# Patient Record
Sex: MALE | Race: WHITE
[De-identification: names, ages, dates, MRNs, and addresses within clinical notes are randomized per-mention and may not be internally consistent; named-entity substitution may affect disease eponyms.]

---

## 2020-04-29 ENCOUNTER — HOSPITAL ENCOUNTER (INPATIENT)
Dept: HOSPITAL 35 - PRE | Age: 60
LOS: 31 days | Discharge: HOME | DRG: 462 | End: 2020-05-30
Attending: ORTHOPAEDIC SURGERY | Admitting: ORTHOPAEDIC SURGERY
Payer: COMMERCIAL

## 2020-04-29 VITALS — BODY MASS INDEX: 37.47 KG/M2 | WEIGHT: 253 LBS | HEIGHT: 69.02 IN

## 2020-04-29 DIAGNOSIS — Z88.8: ICD-10-CM

## 2020-04-29 DIAGNOSIS — Z88.5: ICD-10-CM

## 2020-04-29 DIAGNOSIS — M17.0: Primary | ICD-10-CM

## 2020-04-29 DIAGNOSIS — Z79.899: ICD-10-CM

## 2020-04-29 PROCEDURE — 51225: CPT

## 2020-04-29 PROCEDURE — 50915: CPT

## 2020-04-29 PROCEDURE — 57103: CPT

## 2020-04-29 PROCEDURE — 57095: CPT

## 2020-04-29 PROCEDURE — 52282 CYSTOSCOPY IMPLANT STENT: CPT

## 2020-04-29 PROCEDURE — 62110: CPT

## 2020-04-29 PROCEDURE — 55372: CPT

## 2020-04-29 PROCEDURE — 62900: CPT

## 2020-04-29 PROCEDURE — 64039: CPT

## 2020-04-29 PROCEDURE — 53078: CPT

## 2020-04-29 PROCEDURE — 70005: CPT

## 2020-04-29 PROCEDURE — 50415: CPT

## 2020-04-29 PROCEDURE — 57110 VAGNC COMPL RMVL VAG WALL: CPT

## 2020-04-29 PROCEDURE — 57127: CPT

## 2020-04-29 PROCEDURE — 56528: CPT

## 2020-04-29 PROCEDURE — 53000 INCISION OF URETHRA: CPT

## 2020-04-29 PROCEDURE — 10102: CPT

## 2020-04-29 PROCEDURE — 50954: CPT

## 2020-04-29 PROCEDURE — 51130: CPT

## 2020-04-29 PROCEDURE — 54118: CPT

## 2020-04-29 PROCEDURE — 56527: CPT

## 2020-04-29 PROCEDURE — 50010 RENAL EXPLORATION: CPT

## 2020-04-29 PROCEDURE — 52001 CYSTO W/IRRG&EVAC MLT CLOTS: CPT

## 2020-04-29 PROCEDURE — 57180 TREAT VAGINAL BLEEDING: CPT

## 2020-04-29 PROCEDURE — 50101: CPT

## 2020-04-29 PROCEDURE — 51320: CPT

## 2020-05-22 LAB
ALBUMIN SERPL-MCNC: 4.2 G/DL (ref 3.4–5)
ANION GAP SERPL CALC-SCNC: 6 MMOL/L (ref 7–16)
BILIRUB UR-MCNC: NEGATIVE MG/DL
BUN SERPL-MCNC: 26 MG/DL (ref 7–18)
CALCIUM SERPL-MCNC: 8.7 MG/DL (ref 8.5–10.1)
CHLORIDE SERPL-SCNC: 105 MMOL/L (ref 98–107)
CO2 SERPL-SCNC: 28 MMOL/L (ref 21–32)
COLOR UR: YELLOW
CREAT SERPL-MCNC: 1.1 MG/DL (ref 0.7–1.3)
ERYTHROCYTE [DISTWIDTH] IN BLOOD BY AUTOMATED COUNT: 14.1 % (ref 10.5–14.5)
GLUCOSE SERPL-MCNC: 95 MG/DL (ref 74–106)
HCT VFR BLD CALC: 45.1 % (ref 42–52)
HGB BLD-MCNC: 15.2 GM/DL (ref 14–18)
INR PPP: 1
KETONES UR STRIP-MCNC: NEGATIVE MG/DL
MCH RBC QN AUTO: 29.7 PG (ref 26–34)
MCHC RBC AUTO-ENTMCNC: 33.7 G/DL (ref 28–37)
MCV RBC: 88.2 FL (ref 80–100)
PLATELET # BLD: 189 THOU/UL (ref 150–400)
POTASSIUM SERPL-SCNC: 4.9 MMOL/L (ref 3.5–5.1)
PROTHROMBIN TIME: 10.4 SECONDS (ref 9.3–11.4)
RBC # BLD AUTO: 5.11 MIL/UL (ref 4.5–6)
RBC # UR STRIP: NEGATIVE /UL
SODIUM SERPL-SCNC: 139 MMOL/L (ref 136–145)
SP GR UR STRIP: 1.01 (ref 1–1.03)
URINE CLARITY: CLEAR
URINE GLUCOSE-RANDOM*: NEGATIVE
URINE LEUKOCYTES-REFLEX: NEGATIVE
URINE NITRITE-REFLEX: NEGATIVE
URINE PROTEIN (DIPSTICK): NEGATIVE
UROBILINOGEN UR STRIP-ACNC: 0.2 E.U./DL (ref 0.2–1)
WBC # BLD AUTO: 5.1 THOU/UL (ref 4–11)

## 2020-05-27 VITALS — DIASTOLIC BLOOD PRESSURE: 93 MMHG | SYSTOLIC BLOOD PRESSURE: 133 MMHG

## 2020-05-27 VITALS — SYSTOLIC BLOOD PRESSURE: 132 MMHG | DIASTOLIC BLOOD PRESSURE: 89 MMHG

## 2020-05-27 VITALS — DIASTOLIC BLOOD PRESSURE: 86 MMHG | SYSTOLIC BLOOD PRESSURE: 128 MMHG

## 2020-05-27 VITALS — SYSTOLIC BLOOD PRESSURE: 129 MMHG | DIASTOLIC BLOOD PRESSURE: 83 MMHG

## 2020-05-27 VITALS — DIASTOLIC BLOOD PRESSURE: 83 MMHG | SYSTOLIC BLOOD PRESSURE: 131 MMHG

## 2020-05-27 VITALS — SYSTOLIC BLOOD PRESSURE: 131 MMHG | DIASTOLIC BLOOD PRESSURE: 90 MMHG

## 2020-05-27 PROCEDURE — 0SRC069 REPLACEMENT OF RIGHT KNEE JOINT WITH OXIDIZED ZIRCONIUM ON POLYETHYLENE SYNTHETIC SUBSTITUTE, CEMENTED, OPEN APPROACH: ICD-10-PCS | Performed by: ORTHOPAEDIC SURGERY

## 2020-05-27 PROCEDURE — 8E0Y0CZ ROBOTIC ASSISTED PROCEDURE OF LOWER EXTREMITY, OPEN APPROACH: ICD-10-PCS | Performed by: ORTHOPAEDIC SURGERY

## 2020-05-27 PROCEDURE — 0SRD069 REPLACEMENT OF LEFT KNEE JOINT WITH OXIDIZED ZIRCONIUM ON POLYETHYLENE SYNTHETIC SUBSTITUTE, CEMENTED, OPEN APPROACH: ICD-10-PCS | Performed by: ORTHOPAEDIC SURGERY

## 2020-05-27 NOTE — NUR
PATIENT S/P BILATERAL TOTAL KNEE REPLACEMENT WITH BILATERAL POLAR PACKS, GEORGE
DRESSINGS, AND SCDs. LEGS ELEVATED FOR COMFORT, TURNING SELF TO SIDE, PUMPING
ANKLES, AND USING IS. MS CONTIN BID ALREADY INITIATED, PLAN PERCOCET LATER
WITH PATIENT CONCERNED ABOUT HOW MUCH PAIN HE WILL HAVE WHEN THE BLOCK WEARS
OFF.

## 2020-05-28 VITALS — SYSTOLIC BLOOD PRESSURE: 99 MMHG | DIASTOLIC BLOOD PRESSURE: 61 MMHG

## 2020-05-28 VITALS — SYSTOLIC BLOOD PRESSURE: 107 MMHG | DIASTOLIC BLOOD PRESSURE: 58 MMHG

## 2020-05-28 VITALS — SYSTOLIC BLOOD PRESSURE: 122 MMHG | DIASTOLIC BLOOD PRESSURE: 70 MMHG

## 2020-05-28 VITALS — DIASTOLIC BLOOD PRESSURE: 68 MMHG | SYSTOLIC BLOOD PRESSURE: 124 MMHG

## 2020-05-28 VITALS — DIASTOLIC BLOOD PRESSURE: 69 MMHG | SYSTOLIC BLOOD PRESSURE: 127 MMHG

## 2020-05-28 LAB
ERYTHROCYTE [DISTWIDTH] IN BLOOD BY AUTOMATED COUNT: 13.4 % (ref 10.5–14.5)
HCT VFR BLD CALC: 39 % (ref 42–52)
HGB BLD-MCNC: 13.3 GM/DL (ref 14–18)
MCH RBC QN AUTO: 30 PG (ref 26–34)
MCHC RBC AUTO-ENTMCNC: 34.2 G/DL (ref 28–37)
MCV RBC: 87.6 FL (ref 80–100)
PLATELET # BLD: 200 THOU/UL (ref 150–400)
RBC # BLD AUTO: 4.45 MIL/UL (ref 4.5–6)
WBC # BLD AUTO: 12.9 THOU/UL (ref 4–11)

## 2020-05-28 NOTE — NUR
VSS-LOW GRADE TEMP OF 99.3 AT END OF SHIFT.  C/O PAIN BEHIND RIGHT KNEE, SMALL
BUMP PALPABLE.  NO REDNESS WITH RIGHT CALF.  LEFT CALF AREA IS REDDENED, BUT
NOT SORE, AND NO HARD SPOTS PALPABLE.  STAT ULTRASOUND ORDERED PER DR WHITE.
OOB WITH PHYSICAL THERAPY, TOLERATED WELL.  SPENT MOST OF AFTERNOON IN CHAIR.
GOOD APPETITE, TOLERATED MEALS WITH NO REPORTED N/V.  PAIN WELL CONTROLLED
WITH PO PAIN MEDICATIONS.  CALLS APPROPRIATELY FOR ANY NEEDED ASSISTANCE.

## 2020-05-28 NOTE — NUR
MINIMAL PAIN, PATIENT REPOSTIONING SELF FREQUENTLY AS HE ELEVATES HIS LEGS
WHILE KEEPING KNEES STRAIGHT. ENCOURAGING PERCOCET IF PAIN INCREASES
HAS NOT NEEDED TO BE STRAIGHT CATHED AGAIN WITH A 400 CC VOID NOW AND A
PRACTICALLY FULL URINAL (SPILLED) A LITTLE BEFORE 0300

## 2020-05-28 NOTE — NUR
chart review. ray visited with ashlee via phone call. he is a & o x 4, pleasant
and able to make his needs know. he reported " live in house with wife, safe
and support. independent when feeling ok. has cane and walker, both function
properly and are not broken. drives vehicle. 2 steps with hr into home, inside
everything on main level, do not have to go to the finished basement 12
stairs with hr x 1. have walk in shower and then have shower chair. out pt
rehab at [facility], their therapy is great. [facility] for outpt rehab
at [place] on june 1st.  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
thank you. would like to be dc by 10am since have 7-8 hour drive and have to
stop every hour. hopeful this will work tomorrow"/ashlee. information passed
on to bedside nurse rt dc time for pt long drive home.

## 2020-05-28 NOTE — O
[facility]
[address]
[place]   [address]                     OPERATIVE REPORT              
_______________________________________________________________________________
 
Name:       GRETA GUADALUPE           Room #:         440-P       ADM IN  
M.R.#:      [id number]                       Account #:      [id number]  
Admission:  05/27/20    Attend Phys:    Jeronimo Luis MD  
Discharge:              Date of Birth:  [birth year]  
                                                          Report #: [id number]
                                                                    [id number]   
_______________________________________________________________________________
THIS REPORT FOR:  
 
cc:  OLLIE BAJWA               
     Physician not on staff                                               
     Jeronimo Luis MD                                          ~
CC: OLLIE BAJWA
    Physician staff
    Jeronimo Luis
 
DATE OF SERVICE:  05/27/2020
 
 
PREOPERATIVE DIAGNOSIS:  Bilateral knee osteoarthritis.
 
POSTOPERATIVE DIAGNOSIS:  Bilateral knee osteoarthritis.
 
PROCEDURE:  Bilateral total knee arthroplasty using Navio robotic assistance.
 
SURGEON:  Jeronimo Luis MD.
 
ASSISTANT:  Philly Marshall PA-C.
 
INDICATIONS FOR ASSISTANT:  Throughout the case, extensive retraction and
manipulation of the knees were required.  This was afforded to me by my
assistant.
 
ANESTHESIA:  LMA with adductor canal blocks.
 
IMPLANTS:  Bilateral Smith and Nephew size 6 Journey II BCS Oxinium femur, size
5 tibia, size 9 constrained polyethylene and size 35 patella.
 
TOURNIQUET TIME:  56 minutes on the right and 58 minutes on the left.
 
ESTIMATED BLOOD LOSS:  50 mL.
 
COMPLICATIONS:  None.
 
SPECIMENS:  None.
 
CONDITION UPON LEAVING THE OPERATING ROOM:  Stable.
 
INDICATIONS FOR PROCEDURE:  The patient is a 59-year-old gentleman with
bilateral knee osteoarthritis.  He had failed conservative measures for this and
after discussion with him, he elected for bilateral total knee arthroplasty.
 
DESCRIPTION OF PROCEDURE:  This applies to bilateral knees.  Bilateral knees
 
 
 
[facility]
[address]
[place]   [address]                     OPERATIVE REPORT              
_______________________________________________________________________________
 
Name:       GRETA GUADALUPE           Room #:         440-P       ADM IN  
M.R.#:      [id number]                       Account #:      [id number]  
Admission:  05/27/20    Attend Phys:    Jeronimo Luis MD  
Discharge:              Date of Birth:  [birth year]  
                                                          Report #: [id number]
                                                                    [id number]   
_______________________________________________________________________________
were marked in the preoperative holding area.  IV Ancef was given for
preoperative antibiotics.  He was brought to the operating room and placed in
supine position on operating room table.  LMA anesthesia was induced without
complication.  Tourniquets were placed on bilateral thighs.  Bilateral lower
extremities were prepped and draped in normal sterile fashion.  Timeout was
performed properly identifying the patient and procedure as well as the
instrumentation and implants.  All in the operating room were in agreement.  The
lower extremity was exsanguinated, tourniquet was inflated.  Tourniquet time was
56 minutes for the right and 58 minutes for the left.  Standard midline approach
to knee was made with 10 blade through the skin.  Dissection was taken down
sharply to the fascia and deep flaps were developed medially and laterally. 
Fresh 10 blade was used to make a medial parapatellar arthrotomy and the knee
was inspected.  There was severe tricompartmental osteoarthritis.  ACL and PCL
were removed sharply.  Reference pins were placed in the femur and the tibia. 
The knee was then digitally mapped using the [employer] robotic system. 
Intraoperative plan was made and we sized the size 6 femur, a size 5 tibia, size
10 polyethylene.  After acceptance of the intraoperative plan, the distal
femoral cut was made with a Navio bur.  The femoral cutting block was pinned in
place and chamfer cuts were made.  Attention was then turned to the tibia. 
Remainder of the menisci removed with Bovie cautery.  Tibial resection guide was
pinned in place and tibial resection was made.  After this, flexion and
extension gaps were checked and found to be tight medially in flexion and
extension. A limited medial release was then performed using the pie crust
technique and this balanced the knee well.  Tibia was sized, found to be a size
5.  A size 5 tibial trial was placed, pinned and punched.  A size 6 femoral
trial was placed and the box cut was made.  This was then trialed with a size 9
polyethylene.  Knee was taken through range of motion, found to have good
balance medially throughout range of motion with a 4 mm of laxity laterally.  It
was felt that we could make up for this with a constrained implant.  After this,
trial components were removed.  Bony ends were thoroughly irrigated with normal
saline.  A 9 mm was resected from the posterior surface of the patella and a
size 35 patellar trial button was placed.  Final size 5 tibia, size 6 Journey II
BCS Oxinium femur and a size 35 patella were cemented in place using standard
cementation techniques.  While the cement cured, a periarticular injection
consisting of morphine, ropivacaine, epinephrine, Toradol was placed around the
knee joint capsule.  After the cement cured, tourniquet was deflated. 
Hemostasis was obtained with Bovie cautery.  Final size 9 constrained
polyethylene was placed.  A gram of vancomycin was placed deep in the joint. 
The fascia was closed with 0 Vicryl, skin was closed with 2-0 Vicryl, 3-0
Monocryl, Dermabond and a GEORGE dressing was applied.  The patient tolerated this
procedure well and went to the recovery room under the care of anesthesia
postoperatively.
 
 
  <ELECTRONICALLY SIGNED>
   By: Jeronimo Luis MD          
  05/28/20     1006
D: 05/27/20 1619                           _____________________________________
T: 05/27/20 1632                           Jeronimo Luis MD            /nt

## 2020-05-28 NOTE — NUR
PATIENT TAKES ATACAND 32 MG EVERY EVENING IN ORDER TO AVOID MIGRAINES. DR THOMAS ON CALL HAS OK'D THIS BUT OUR PHARMACY DOES NOT HAVE IT ON THEIR
FORMULARY. PER PATIENT REQUEST, DR THOMAS WILL CALL IN A PRESCRIPTION
FOR A FEW PILLS TO [facility] [phone]

## 2020-05-29 VITALS — SYSTOLIC BLOOD PRESSURE: 122 MMHG | DIASTOLIC BLOOD PRESSURE: 66 MMHG

## 2020-05-29 VITALS — DIASTOLIC BLOOD PRESSURE: 75 MMHG | SYSTOLIC BLOOD PRESSURE: 133 MMHG

## 2020-05-29 VITALS — DIASTOLIC BLOOD PRESSURE: 74 MMHG | SYSTOLIC BLOOD PRESSURE: 135 MMHG

## 2020-05-29 VITALS — SYSTOLIC BLOOD PRESSURE: 107 MMHG | DIASTOLIC BLOOD PRESSURE: 57 MMHG

## 2020-05-29 LAB
ERYTHROCYTE [DISTWIDTH] IN BLOOD BY AUTOMATED COUNT: 14 % (ref 10.5–14.5)
HCT VFR BLD CALC: 36.1 % (ref 42–52)
HGB BLD-MCNC: 12.3 GM/DL (ref 14–18)
MCH RBC QN AUTO: 30.1 PG (ref 26–34)
MCHC RBC AUTO-ENTMCNC: 34.2 G/DL (ref 28–37)
MCV RBC: 88 FL (ref 80–100)
PLATELET # BLD: 172 THOU/UL (ref 150–400)
RBC # BLD AUTO: 4.1 MIL/UL (ref 4.5–6)
WBC # BLD AUTO: 10.1 THOU/UL (ref 4–11)

## 2020-05-29 NOTE — NUR
1900 ASSUMED CARE OF PT AFTER BEDSIDE REPORT, PT IN CHAIR WITH NO COMPLAINTS
OR QUESTIONS AT THIS TIME, 2000 BASELINE ASSESSMENT COMPLETE, FALL PRECAUTIONS
IN PLACE, URINAL EMPTIED OF 300CC CLEAR YELLOW URINE, PEDAL PULSES AND RADIAL
PULSES 2+BILATERALLY, TOES PWD BILATERAL GREAT TOE CAP REFILL<3SEC MOVEMENT
AND STRENGTH EQUAL IN BLE, PAIN 4/10 TO B KNEES, WILL CONTINUE TO MONITOR WITH
HOURLY ROUNDING, POLAR PACKS FILLED WITH ICE, AND PT DENIES ANY QUESTIONS OR
CONCERNS AT THIS TIME 2130 PAIN MED GIVEN AS PER MAR PER PT REQUEST AND PAIN
TO B KNEES 5/10 WORSE IN R KNEE WILL RETURN AT 2200 TO HELP PT BACK TO BED
FROM CHAIR, PT STATES HE IS MOVING LEGS AND IS USING IS HOURLY

## 2020-05-29 NOTE — NUR
ASSUMED PT CARE AT 1900. PT WOKE UP IN MIDDLE OF THE NIGHT WITH C/O PAIN. IV
PAIN MEDS GIVEN FOR BREAKTRHOUGH, PROVIDED RELIEF. USING URINAL AT BEDSIDE.
ANTICIPATING THERAPY THIS AM, THEN DISCHARGING EARLY. NO OTHER SIGNIFCANT
CHANGES.

## 2020-05-29 NOTE — NUR
PT CARE ASSUMED AT 0700.A&0x4. PT POST OP DAY 2 WITH A PENDING DISCHARGE PER
PT EVALUATION. PT NOT ABLE TO DISCHARGE TO TO OT BEING ABLE TO WALK THE STAIRS
OR SHORT DISTANCES WITHOUT PAIN. PT PAIN MANAGED WITH PAIN MEDICATION AND
NEEDING MORE TODAY THEN YESTERDAY. HE IS NOT ABLE TO DISCHARGE TODAY. DR. KRUEGER OFFICE IS AWARE. ALEJA HOSES/POLARPACK/SCD'S IN PLACE. PT UP IN THE
RECLINER ALL DAY. IV PATENT, WITH NO REDNESS OR EDEMA. CELEBREX ADDED TO THE
EMAR. FALL PROTOCOL IN PLACE. CALL LIGHT IN REACH. PT DECLINED FAMILY UPDATE.
WILL CONTINUE TO MONITOR.

## 2020-05-30 VITALS — DIASTOLIC BLOOD PRESSURE: 74 MMHG | SYSTOLIC BLOOD PRESSURE: 115 MMHG

## 2020-05-30 VITALS — DIASTOLIC BLOOD PRESSURE: 62 MMHG | SYSTOLIC BLOOD PRESSURE: 123 MMHG

## 2020-05-30 LAB
ERYTHROCYTE [DISTWIDTH] IN BLOOD BY AUTOMATED COUNT: 13.7 % (ref 10.5–14.5)
HCT VFR BLD CALC: 34.7 % (ref 42–52)
HGB BLD-MCNC: 11.8 GM/DL (ref 14–18)
MCH RBC QN AUTO: 29.9 PG (ref 26–34)
MCHC RBC AUTO-ENTMCNC: 34 G/DL (ref 28–37)
MCV RBC: 87.8 FL (ref 80–100)
PLATELET # BLD: 169 THOU/UL (ref 150–400)
RBC # BLD AUTO: 3.96 MIL/UL (ref 4.5–6)
WBC # BLD AUTO: 8.8 THOU/UL (ref 4–11)

## 2020-05-30 NOTE — NUR
Assumed care of pt at 0700. Pt a&ox4. Dressing on gerson knee c/d/i. Polar pack,
SCDs, and ALEJA hose in place. Nurse supervisor visited with pt for issue pt had
yesterday with [employer] tech. Physical therapy states pt is safe to go home. Pt
discharged to home. States he already has pain med scripts from doctor.

## 2022-01-11 NOTE — NUR
Addended by: Elly Holloway on: 1/11/2022 11:33 AM     Modules accepted: Orders ON-GOING ASSESSMENT: CM REVIEWED CHART. PT HAD INCREASED PAIN TODAY AFTER HIS
COMPRESSION STOCKING WAS REMOVED. PLAN IS FOR PATIENT TO DISCHARGE HOME
SATURDAY IF HE IS ABLE TO DO STAIRS WITH PHYSCIAL THERAPY OR POSSIBLY SUNDAY.
PT ALREADY HAS OUTPATIENT THERAPY ARRANGED AND A WALKER AT HOME. PLANS FOR
PATIENT TO DISCHARGE WITH OUTPATIENT THERAPIES AT DISCHARGE.